# Patient Record
Sex: MALE | Race: WHITE | NOT HISPANIC OR LATINO | Employment: UNEMPLOYED | URBAN - METROPOLITAN AREA
[De-identification: names, ages, dates, MRNs, and addresses within clinical notes are randomized per-mention and may not be internally consistent; named-entity substitution may affect disease eponyms.]

---

## 2021-03-26 ENCOUNTER — OFFICE VISIT (OUTPATIENT)
Dept: URGENT CARE | Facility: CLINIC | Age: 66
End: 2021-03-26

## 2021-03-26 VITALS
WEIGHT: 177 LBS | HEART RATE: 86 BPM | OXYGEN SATURATION: 96 % | DIASTOLIC BLOOD PRESSURE: 92 MMHG | BODY MASS INDEX: 27.78 KG/M2 | HEIGHT: 67 IN | TEMPERATURE: 98.6 F | SYSTOLIC BLOOD PRESSURE: 130 MMHG | RESPIRATION RATE: 18 BRPM

## 2021-03-26 DIAGNOSIS — K62.89 RECTAL PAIN: ICD-10-CM

## 2021-03-26 DIAGNOSIS — R10.9 ABDOMINAL PAIN, UNSPECIFIED ABDOMINAL LOCATION: ICD-10-CM

## 2021-03-26 DIAGNOSIS — R68.83 CHILLS: ICD-10-CM

## 2021-03-26 DIAGNOSIS — R19.4 CHANGE IN BOWEL HABITS: ICD-10-CM

## 2021-03-26 PROCEDURE — 99999 PR NO CHARGE: CPT | Performed by: FAMILY MEDICINE

## 2021-03-26 RX ORDER — LEVOTHYROXINE SODIUM 88 UG/1
TABLET ORAL
COMMUNITY
Start: 2021-02-02

## 2021-03-26 RX ORDER — ALBUTEROL SULFATE 90 UG/1
AEROSOL, METERED RESPIRATORY (INHALATION)
COMMUNITY
Start: 2021-02-11

## 2021-03-26 NOTE — PATIENT INSTRUCTIONS
Patient was advised to go to the nearest emergency department of his choice for further evaluation.

## 2021-03-26 NOTE — PROGRESS NOTES
"Subjective:      Marlo Mcneal is a 65 y.o. male who presents with Dysuria (burning with urination, pain in groin, pus and muscus with bowel movements)            This is a new problem.  65-year-old presenting for evaluation of multiple symptoms.  He reports that last week he had some burning sensation with urination.  Over the course of the past few days has been passing mucousy bowel movements also having some abdominal pain, decreased appetite and rectal pain.  Denies any fever but has had multiple episodes of chills.      Review of Systems   All other systems reviewed and are negative.         Objective:     /92 (BP Location: Left arm, Patient Position: Sitting, BP Cuff Size: Adult)   Pulse 86   Temp 37 °C (98.6 °F) (Temporal)   Resp 18   Ht 1.702 m (5' 7\")   Wt 80.3 kg (177 lb)   SpO2 96%   BMI 27.72 kg/m²      Physical Exam  Constitutional:       General: He is not in acute distress.     Appearance: He is not ill-appearing, toxic-appearing or diaphoretic.   HENT:      Head: Normocephalic and atraumatic.   Eyes:      Conjunctiva/sclera: Conjunctivae normal.   Cardiovascular:      Rate and Rhythm: Normal rate.   Pulmonary:      Effort: Pulmonary effort is normal. No respiratory distress.      Breath sounds: No stridor.   Abdominal:      Palpations: There is no mass.      Tenderness: There is abdominal tenderness in the left lower quadrant. There is guarding. There is no rebound.       Skin:     Coloration: Skin is not jaundiced or pale.   Neurological:      Mental Status: He is alert and oriented to person, place, and time.   Psychiatric:         Thought Content: Thought content normal.                 Assessment/Plan:   ASSESSMENT:PLAN:  1. Abdominal pain, unspecified abdominal location  2. Chills  3. Change in bowel habits  4. Rectal pain    Given the constellation of symptoms recommend further immediate evaluation in the emergency department setting.  No charge       "